# Patient Record
Sex: FEMALE | Race: WHITE | NOT HISPANIC OR LATINO
[De-identification: names, ages, dates, MRNs, and addresses within clinical notes are randomized per-mention and may not be internally consistent; named-entity substitution may affect disease eponyms.]

---

## 2020-02-20 ENCOUNTER — TRANSCRIPTION ENCOUNTER (OUTPATIENT)
Age: 55
End: 2020-02-20

## 2021-11-02 ENCOUNTER — APPOINTMENT (OUTPATIENT)
Dept: OBGYN | Facility: CLINIC | Age: 56
End: 2021-11-02
Payer: COMMERCIAL

## 2021-11-02 VITALS
SYSTOLIC BLOOD PRESSURE: 114 MMHG | DIASTOLIC BLOOD PRESSURE: 76 MMHG | HEIGHT: 62 IN | WEIGHT: 140 LBS | BODY MASS INDEX: 25.76 KG/M2

## 2021-11-02 DIAGNOSIS — Z12.39 ENCOUNTER FOR OTHER SCREENING FOR MALIGNANT NEOPLASM OF BREAST: ICD-10-CM

## 2021-11-02 DIAGNOSIS — Z01.419 ENCOUNTER FOR GYNECOLOGICAL EXAMINATION (GENERAL) (ROUTINE) W/OUT ABNORMAL FINDINGS: ICD-10-CM

## 2021-11-02 DIAGNOSIS — Z86.19 PERSONAL HISTORY OF OTHER INFECTIOUS AND PARASITIC DISEASES: ICD-10-CM

## 2021-11-02 DIAGNOSIS — N83.00 "FOLLICULAR CYST OF OVARY, UNSPECIFIED SIDE": ICD-10-CM

## 2021-11-02 PROCEDURE — 99386 PREV VISIT NEW AGE 40-64: CPT

## 2021-11-03 LAB — HPV HIGH+LOW RISK DNA PNL CVX: NOT DETECTED

## 2022-02-01 ENCOUNTER — NON-APPOINTMENT (OUTPATIENT)
Age: 57
End: 2022-02-01

## 2022-02-01 ENCOUNTER — APPOINTMENT (OUTPATIENT)
Dept: OBGYN | Facility: CLINIC | Age: 57
End: 2022-02-01
Payer: COMMERCIAL

## 2022-02-01 VITALS
BODY MASS INDEX: 26.13 KG/M2 | DIASTOLIC BLOOD PRESSURE: 74 MMHG | WEIGHT: 142 LBS | HEIGHT: 62 IN | SYSTOLIC BLOOD PRESSURE: 116 MMHG

## 2022-02-01 PROCEDURE — 99212 OFFICE O/P EST SF 10 MIN: CPT

## 2023-03-28 ENCOUNTER — APPOINTMENT (OUTPATIENT)
Dept: OBGYN | Facility: CLINIC | Age: 58
End: 2023-03-28
Payer: COMMERCIAL

## 2023-03-28 VITALS — DIASTOLIC BLOOD PRESSURE: 70 MMHG | SYSTOLIC BLOOD PRESSURE: 120 MMHG | BODY MASS INDEX: 26.7 KG/M2 | WEIGHT: 146 LBS

## 2023-03-28 DIAGNOSIS — N95.2 POSTMENOPAUSAL ATROPHIC VAGINITIS: ICD-10-CM

## 2023-03-28 PROCEDURE — 99396 PREV VISIT EST AGE 40-64: CPT

## 2023-03-30 LAB
CYTOLOGY CVX/VAG DOC THIN PREP: ABNORMAL
HPV HIGH+LOW RISK DNA PNL CVX: NOT DETECTED

## 2023-08-24 ENCOUNTER — NON-APPOINTMENT (OUTPATIENT)
Age: 58
End: 2023-08-24

## 2023-09-04 RX ORDER — ESTRADIOL 0.1 MG/G
0.1 CREAM VAGINAL
Qty: 1 | Refills: 2 | Status: ACTIVE | COMMUNITY
Start: 2021-11-02 | End: 1900-01-01

## 2023-12-31 PROBLEM — Z86.19 HISTORY OF SEXUALLY TRANSMITTED DISEASE: Status: RESOLVED | Noted: 2021-11-02 | Resolved: 2023-12-31

## 2024-04-02 ENCOUNTER — APPOINTMENT (OUTPATIENT)
Dept: OBGYN | Facility: CLINIC | Age: 59
End: 2024-04-02
Payer: COMMERCIAL

## 2024-04-02 ENCOUNTER — TRANSCRIPTION ENCOUNTER (OUTPATIENT)
Age: 59
End: 2024-04-02

## 2024-04-02 VITALS — DIASTOLIC BLOOD PRESSURE: 67 MMHG | BODY MASS INDEX: 29.26 KG/M2 | WEIGHT: 160 LBS | SYSTOLIC BLOOD PRESSURE: 122 MMHG

## 2024-04-02 DIAGNOSIS — Z01.419 ENCOUNTER FOR GYNECOLOGICAL EXAMINATION (GENERAL) (ROUTINE) W/OUT ABNORMAL FINDINGS: ICD-10-CM

## 2024-04-02 DIAGNOSIS — Z00.00 ENCOUNTER FOR GENERAL ADULT MEDICAL EXAMINATION W/OUT ABNORMAL FINDINGS: ICD-10-CM

## 2024-04-02 PROCEDURE — 99396 PREV VISIT EST AGE 40-64: CPT

## 2024-05-01 ENCOUNTER — APPOINTMENT (OUTPATIENT)
Dept: NUTRITION | Facility: CLINIC | Age: 59
End: 2024-05-01
Payer: COMMERCIAL

## 2024-05-01 PROCEDURE — 97802 MEDICAL NUTRITION INDIV IN: CPT | Mod: 95

## 2024-06-04 ENCOUNTER — APPOINTMENT (OUTPATIENT)
Dept: NUTRITION | Facility: CLINIC | Age: 59
End: 2024-06-04

## 2024-07-30 ENCOUNTER — APPOINTMENT (OUTPATIENT)
Dept: ORTHOPEDIC SURGERY | Facility: CLINIC | Age: 59
End: 2024-07-30
Payer: COMMERCIAL

## 2024-07-30 VITALS — WEIGHT: 160 LBS | HEIGHT: 62 IN | BODY MASS INDEX: 29.44 KG/M2

## 2024-07-30 DIAGNOSIS — S86.312A STRAIN OF MUSCLE(S) AND TENDON(S) OF PERONEAL MUSCLE GROUP AT LOWER LEG LEVEL, LEFT LEG, INITIAL ENCOUNTER: ICD-10-CM

## 2024-07-30 PROCEDURE — 73610 X-RAY EXAM OF ANKLE: CPT | Mod: LT

## 2024-07-30 PROCEDURE — L4361: CPT | Mod: LT

## 2024-07-30 PROCEDURE — 99203 OFFICE O/P NEW LOW 30 MIN: CPT | Mod: 25

## 2024-07-30 NOTE — ASSESSMENT
[FreeTextEntry1] : cam boot ice/elevate nsaids prn MRI to eval for peroneal tendon tear  further plan pending MRI

## 2024-07-30 NOTE — PHYSICAL EXAM
[Left] : left foot and ankle [5___] : eversion 5[unfilled]/5 [2+] : dorsalis pedis pulse: 2+ [] : negative anterior drawer at ankle [[id number]] : dorsiflexion 10 degrees [de-identified] : plantar flexion 30 degrees [de-identified] : inversion 10 degrees [de-identified] : eversion 10 degrees

## 2024-07-30 NOTE — HISTORY OF PRESENT ILLNESS
[6] : 6 [2] : 2 [de-identified] : 7/30/24: injury hiking 7/21 w/ ankle pain. [place]. having continued pain/swelling. no prior ankle probs. denies dm/tob. 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

## 2024-08-13 ENCOUNTER — RESULT REVIEW (OUTPATIENT)
Age: 59
End: 2024-08-13

## 2024-08-13 ENCOUNTER — APPOINTMENT (OUTPATIENT)
Dept: ORTHOPEDIC SURGERY | Facility: CLINIC | Age: 59
End: 2024-08-13

## 2024-08-20 ENCOUNTER — APPOINTMENT (OUTPATIENT)
Dept: ORTHOPEDIC SURGERY | Facility: CLINIC | Age: 59
End: 2024-08-20
Payer: COMMERCIAL

## 2024-08-20 DIAGNOSIS — S93.492A SPRAIN OF OTHER LIGAMENT OF LEFT ANKLE, INITIAL ENCOUNTER: ICD-10-CM

## 2024-08-20 PROCEDURE — 99213 OFFICE O/P EST LOW 20 MIN: CPT

## 2024-08-20 PROCEDURE — L4350: CPT | Mod: LT

## 2024-08-20 NOTE — HISTORY OF PRESENT ILLNESS
[6] : 6 [2] : 2 [de-identified] : 7/30/24: injury hiking 7/21 w/ ankle pain. [place]. having continued pain/swelling. no prior ankle probs. 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 walking in boot [] : Post Surgical Visit: no [FreeTextEntry1] : left ankle  [de-identified] : xrays  [de-identified] : none

## 2024-08-20 NOTE — DATA REVIEWED
[MRI] : MRI [Left] : left [Ankle] : ankle [I reviewed the films/CD and additionally noted] : I reviewed the films/CD and additionally noted [FreeTextEntry1] : talar contusion; prior lateral sprain; peroneal tendonitis -- rod

## 2024-08-20 NOTE — PHYSICAL EXAM
[Left] : left foot and ankle [5___] : eversion 5[unfilled]/5 [2+] : dorsalis pedis pulse: 2+ [] : negative anterior drawer at ankle [[id number]] : dorsiflexion 10 degrees [de-identified] : plantar flexion 30 degrees [de-identified] : inversion 10 degrees [de-identified] : eversion 10 degrees

## 2024-09-17 ENCOUNTER — APPOINTMENT (OUTPATIENT)
Dept: ORTHOPEDIC SURGERY | Facility: CLINIC | Age: 59
End: 2024-09-17

## 2024-10-01 ENCOUNTER — RESULT CHARGE (OUTPATIENT)
Age: 59
End: 2024-10-01

## 2024-10-02 ENCOUNTER — NON-APPOINTMENT (OUTPATIENT)
Age: 59
End: 2024-10-02

## 2024-10-02 ENCOUNTER — APPOINTMENT (OUTPATIENT)
Dept: FAMILY MEDICINE | Facility: CLINIC | Age: 59
End: 2024-10-02
Payer: COMMERCIAL

## 2024-10-02 ENCOUNTER — LABORATORY RESULT (OUTPATIENT)
Age: 59
End: 2024-10-02

## 2024-10-02 VITALS
SYSTOLIC BLOOD PRESSURE: 116 MMHG | TEMPERATURE: 97.1 F | WEIGHT: 154 LBS | OXYGEN SATURATION: 97 % | DIASTOLIC BLOOD PRESSURE: 76 MMHG | BODY MASS INDEX: 28.34 KG/M2 | HEART RATE: 81 BPM | HEIGHT: 62 IN

## 2024-10-02 DIAGNOSIS — F32.A ANXIETY DISORDER, UNSPECIFIED: ICD-10-CM

## 2024-10-02 DIAGNOSIS — Z00.00 ENCOUNTER FOR GENERAL ADULT MEDICAL EXAMINATION W/OUT ABNORMAL FINDINGS: ICD-10-CM

## 2024-10-02 DIAGNOSIS — R53.83 OTHER FATIGUE: ICD-10-CM

## 2024-10-02 DIAGNOSIS — Z83.3 FAMILY HISTORY OF DIABETES MELLITUS: ICD-10-CM

## 2024-10-02 DIAGNOSIS — Z83.518 FAMILY HISTORY OF OTHER SPECIFIED EYE DISORDER: ICD-10-CM

## 2024-10-02 DIAGNOSIS — Z80.3 FAMILY HISTORY OF MALIGNANT NEOPLASM OF BREAST: ICD-10-CM

## 2024-10-02 DIAGNOSIS — F41.9 ANXIETY DISORDER, UNSPECIFIED: ICD-10-CM

## 2024-10-02 DIAGNOSIS — Z82.49 FAMILY HISTORY OF ISCHEMIC HEART DISEASE AND OTHER DISEASES OF THE CIRCULATORY SYSTEM: ICD-10-CM

## 2024-10-02 PROCEDURE — 36415 COLL VENOUS BLD VENIPUNCTURE: CPT

## 2024-10-02 PROCEDURE — 93000 ELECTROCARDIOGRAM COMPLETE: CPT

## 2024-10-02 PROCEDURE — 99386 PREV VISIT NEW AGE 40-64: CPT

## 2024-10-02 RX ORDER — ESCITALOPRAM OXALATE 20 MG/1
TABLET ORAL
Refills: 0 | Status: ACTIVE | COMMUNITY

## 2024-10-02 RX ORDER — ESCITALOPRAM OXALATE 20 MG/1
20 TABLET ORAL
Qty: 90 | Refills: 3 | Status: ACTIVE | COMMUNITY
Start: 2024-10-02 | End: 1900-01-01

## 2024-10-02 RX ORDER — SAW/PYGEUM/BETA/HERB/D3/B6/ZN 30 MG-25MG
CAPSULE ORAL
Refills: 0 | Status: ACTIVE | COMMUNITY

## 2024-10-02 NOTE — HEALTH RISK ASSESSMENT
[Yes] : Yes [2 - 4 times a month (2 pts)] : 2-4 times a month (2 points) [1 or 2 (0 pts)] : 1 or 2 (0 points) [Never (0 pts)] : Never (0 points) [No] : In the past 12 months have you used drugs other than those required for medical reasons? No [No falls in past year] : Patient reported no falls in the past year [0] : 2) Feeling down, depressed, or hopeless: Not at all (0) [PHQ-2 Negative - No further assessment needed] : PHQ-2 Negative - No further assessment needed [HIV test declined] : HIV test declined [Hepatitis C test declined] : Hepatitis C test declined [None] : None [Alone] : lives alone [Employed] : employed [Single] : single [] :  [# Of Children ___] : has [unfilled] children [Feels Safe at Home] : Feels safe at home [Fully functional (bathing, dressing, toileting, transferring, walking, feeding)] : Fully functional (bathing, dressing, toileting, transferring, walking, feeding) [Fully functional (using the telephone, shopping, preparing meals, housekeeping, doing laundry, using] : Fully functional and needs no help or supervision to perform IADLs (using the telephone, shopping, preparing meals, housekeeping, doing laundry, using transportation, managing medications and managing finances) [With Patient/Caregiver] : , with patient/caregiver [Reviewed no changes] : Reviewed, no changes [I will adhere to the patient's wishes.] : I will adhere to the patient's wishes. [Never] : Never [Audit-CScore] : 2 [de-identified] : walks, mild weight training [de-identified] : well balanced but admits to snacking [[id number]] : 0 [Patient reported mammogram was normal] : Patient reported mammogram was normal [Patient reported PAP Smear was normal] : Patient reported PAP Smear was normal [Patient reported colonoscopy was normal] : Patient reported colonoscopy was normal [Change in mental status noted] : No change in mental status noted [Language] : denies difficulty with language [Sexually Active] : not sexually active [High Risk Behavior] : no high risk behavior [Reports changes in hearing] : Reports no changes in hearing [Reports changes in vision] : Reports no changes in vision [Reports changes in dental health] : Reports no changes in dental health [MammogramDate] : 08/2023 [PapSmearDate] : 04/2024 [ColonoscopyDate] : 2015 [FreeTextEntry2] : permanent  at [facility]

## 2024-10-02 NOTE — REVIEW OF SYSTEMS
[Fever] : no fever [Chills] : no chills [Fatigue] : fatigue [Recent Change In Weight] : ~T no recent weight change [Negative] : Heme/Lymph

## 2024-10-02 NOTE — HISTORY OF PRESENT ILLNESS
[FreeTextEntry1] : CPE. [de-identified] : Patient is a 60yo female who presents to the office for CPE.  Pt is new to our practice.    Last CPE:  08/2023, previous provider Dr. Beth.  GYN Exam:  04/2024, Dr. Roca; Pap negative for intraepithelial lesion or malignancy.  Mammogram:  scheduled for today; 08/2023, BIRADS-2.  Colonoscopy:  age 50, reportedly normal; Dr. Beth.  Ophthalmology:  wears readers.  Plans to see ophtho in near future. Dermatology:  UTD. Dentist:  UTD. Shingles:  due, recommended today. Flu:  declines. Tdap:  unsure of last but believes was within the past 10 years. COVID:  UTD. LMP:  ~15 years ago.  Pt reports fatigue, states sometimes sleeps 12 hours per night.

## 2024-10-02 NOTE — ASSESSMENT
[FreeTextEntry1] : Patient is a 58yo female who presents to the office for CPE. Pt is new to our practice.  Health Maintenance - Fasting labs drawn in office. - Pt prefers phone call with results. - EKG performed in office NSR, no acute ST/T changes, no arrhythmias.  Pt denies cardiac complaints. - Eat plenty of fruits and vegetables, especially deeply colored fruits/vegetables (such as leafy greens, peaches) that are more nutrient-dense.  Continue to work hard on diet and exercise, limiting/avoiding saturated fat, fatty foods, greasy foods, red meats, white flour-based carbohydrates (cookies, cakes, white bread, white rice), and added sugars.  Chose whole grain foods and products made with whole grains over refined grains and white flour-based carbohydrates.  Avoid beverages and food with added sugar.  Limit salt intake to improve blood pressure.  Limit alcohol intake. - Try and incorporate 30 minutes of aerobic exercise to your daily routine.  Anxiety/Depression - Feels well/stable on current medication regimen. - Continue Lexapro 20mg daily.  Will take over prescribing. - F/u therapist PRN (just stopped seeing her therapist because felt not needed right now). - Alert office if symptoms worsen.  Call the office or go to the ED immediately if you develop new, worsening or concerning symptoms including high fever, severe headache/worst headache of your life, confusion, dizziness/lightheadedness, loss of consciousness, severe chest pain, difficulty breathing, shortness of breath, severe abdominal pain, excessive vomiting/diarrhea, inability to feel/move the extremities, or any other concerning symptoms.

## 2024-10-03 DIAGNOSIS — E78.5 HYPERLIPIDEMIA, UNSPECIFIED: ICD-10-CM

## 2024-10-03 LAB
25(OH)D3 SERPL-MCNC: 46 NG/ML
ALBUMIN SERPL ELPH-MCNC: 4.5 G/DL
ALP BLD-CCNC: 107 U/L
ALT SERPL-CCNC: 18 U/L
ANION GAP SERPL CALC-SCNC: 11 MMOL/L
AST SERPL-CCNC: 16 U/L
BASOPHILS # BLD AUTO: 0.07 K/UL
BASOPHILS NFR BLD AUTO: 1 %
BILIRUB SERPL-MCNC: 0.5 MG/DL
BUN SERPL-MCNC: 15 MG/DL
CALCIUM SERPL-MCNC: 9.7 MG/DL
CHLORIDE SERPL-SCNC: 102 MMOL/L
CHOLEST SERPL-MCNC: 239 MG/DL
CO2 SERPL-SCNC: 27 MMOL/L
CREAT SERPL-MCNC: 0.98 MG/DL
EGFR: 66 ML/MIN/1.73M2
EOSINOPHIL # BLD AUTO: 0.1 K/UL
EOSINOPHIL NFR BLD AUTO: 1.4 %
ESTIMATED AVERAGE GLUCOSE: 108 MG/DL
FERRITIN SERPL-MCNC: 112 NG/ML
FOLATE SERPL-MCNC: 13.5 NG/ML
GLUCOSE SERPL-MCNC: 86 MG/DL
HBA1C MFR BLD HPLC: 5.4 %
HCT VFR BLD CALC: 41.9 %
HDLC SERPL-MCNC: 61 MG/DL
HGB BLD-MCNC: 13.8 G/DL
IMM GRANULOCYTES NFR BLD AUTO: 0.1 %
IRON SATN MFR SERPL: 36 %
IRON SERPL-MCNC: 109 UG/DL
LDLC SERPL CALC-MCNC: 161 MG/DL
LYMPHOCYTES # BLD AUTO: 3.08 K/UL
LYMPHOCYTES NFR BLD AUTO: 43.4 %
MAN DIFF?: NORMAL
MCHC RBC-ENTMCNC: 31.2 PG
MCHC RBC-ENTMCNC: 32.9 GM/DL
MCV RBC AUTO: 94.8 FL
MONOCYTES # BLD AUTO: 0.38 K/UL
MONOCYTES NFR BLD AUTO: 5.4 %
NEUTROPHILS # BLD AUTO: 3.45 K/UL
NEUTROPHILS NFR BLD AUTO: 48.7 %
NONHDLC SERPL-MCNC: 179 MG/DL
PLATELET # BLD AUTO: 290 K/UL
POTASSIUM SERPL-SCNC: 4.5 MMOL/L
PROT SERPL-MCNC: 7.5 G/DL
RBC # BLD: 4.42 M/UL
RBC # FLD: 13.3 %
SODIUM SERPL-SCNC: 140 MMOL/L
TIBC SERPL-MCNC: 306 UG/DL
TRIGL SERPL-MCNC: 101 MG/DL
TSH SERPL-ACNC: 1.23 UIU/ML
UIBC SERPL-MCNC: 197 UG/DL
VIT B12 SERPL-MCNC: 393 PG/ML
WBC # FLD AUTO: 7.09 K/UL

## 2025-04-18 ENCOUNTER — APPOINTMENT (OUTPATIENT)
Dept: OBGYN | Facility: CLINIC | Age: 60
End: 2025-04-18

## 2025-05-01 DIAGNOSIS — N95.1 MENOPAUSAL AND FEMALE CLIMACTERIC STATES: ICD-10-CM

## 2025-05-01 RX ORDER — ESTRADIOL 10 UG/1
10 TABLET VAGINAL
Qty: 60 | Refills: 0 | Status: ACTIVE | COMMUNITY
Start: 2025-05-01 | End: 1900-01-01

## 2025-06-30 ENCOUNTER — APPOINTMENT (OUTPATIENT)
Dept: OBGYN | Facility: CLINIC | Age: 60
End: 2025-06-30
Payer: COMMERCIAL

## 2025-06-30 VITALS
WEIGHT: 164 LBS | DIASTOLIC BLOOD PRESSURE: 70 MMHG | SYSTOLIC BLOOD PRESSURE: 116 MMHG | HEIGHT: 62 IN | BODY MASS INDEX: 30.18 KG/M2

## 2025-06-30 PROCEDURE — 99396 PREV VISIT EST AGE 40-64: CPT

## 2025-07-07 LAB
CYTOLOGY CVX/VAG DOC THIN PREP: NORMAL
HPV HIGH+LOW RISK DNA PNL CVX: NOT DETECTED